# Patient Record
Sex: FEMALE | Race: WHITE | NOT HISPANIC OR LATINO | Employment: OTHER | URBAN - METROPOLITAN AREA
[De-identification: names, ages, dates, MRNs, and addresses within clinical notes are randomized per-mention and may not be internally consistent; named-entity substitution may affect disease eponyms.]

---

## 2020-12-26 ENCOUNTER — OFFICE VISIT (OUTPATIENT)
Dept: URGENT CARE | Facility: CLINIC | Age: 75
End: 2020-12-26
Payer: COMMERCIAL

## 2020-12-26 ENCOUNTER — HOSPITAL ENCOUNTER (OUTPATIENT)
Dept: RADIOLOGY | Facility: CLINIC | Age: 75
Discharge: HOME | End: 2020-12-26
Attending: PHYSICIAN ASSISTANT
Payer: COMMERCIAL

## 2020-12-26 VITALS
TEMPERATURE: 98 F | BODY MASS INDEX: 34.96 KG/M2 | SYSTOLIC BLOOD PRESSURE: 195 MMHG | HEIGHT: 62 IN | DIASTOLIC BLOOD PRESSURE: 75 MMHG | OXYGEN SATURATION: 95 % | HEART RATE: 63 BPM | WEIGHT: 190 LBS
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
## 2020-12-26 DIAGNOSIS — J90 PLEURAL EFFUSION, NOT ELSEWHERE CLASSIFIED: ICD-10-CM

## 2020-12-26 DIAGNOSIS — I10 ELEVATED BLOOD PRESSURE READING IN OFFICE WITH DIAGNOSIS OF HYPERTENSION: ICD-10-CM

## 2020-12-26 DIAGNOSIS — S22.42XA CLOSED FRACTURE OF MULTIPLE RIBS OF LEFT SIDE, INITIAL ENCOUNTER: ICD-10-CM

## 2020-12-26 PROCEDURE — 99214 OFFICE O/P EST MOD 30 MIN: CPT | Mod: S$GLB,,, | Performed by: PHYSICIAN ASSISTANT

## 2020-12-26 PROCEDURE — 71101 X-RAY EXAM UNILAT RIBS/CHEST: CPT | Mod: LT,S$GLB,, | Performed by: RADIOLOGY

## 2020-12-26 PROCEDURE — 99214 PR OFFICE/OUTPT VISIT, EST, LEVL IV, 30-39 MIN: ICD-10-PCS | Mod: S$GLB,,, | Performed by: PHYSICIAN ASSISTANT

## 2020-12-26 PROCEDURE — 1125F AMNT PAIN NOTED PAIN PRSNT: CPT | Mod: S$GLB,,, | Performed by: PHYSICIAN ASSISTANT

## 2020-12-26 PROCEDURE — 1125F PR PAIN SEVERITY QUANTIFIED, PAIN PRESENT: ICD-10-PCS | Mod: S$GLB,,, | Performed by: PHYSICIAN ASSISTANT

## 2020-12-26 PROCEDURE — 71101 XR RIBS MIN 3 VIEWS W/ PA CHEST LEFT: ICD-10-PCS | Mod: LT,S$GLB,, | Performed by: RADIOLOGY

## 2020-12-26 RX ORDER — VITAMIN B COMPLEX
1 CAPSULE ORAL
COMMUNITY

## 2020-12-26 RX ORDER — IRBESARTAN 300 MG/1
300 TABLET ORAL
COMMUNITY
Start: 2020-10-06

## 2020-12-26 RX ORDER — TRAMADOL HYDROCHLORIDE 50 MG/1
50 TABLET ORAL EVERY 6 HOURS PRN
Qty: 20 TABLET | Refills: 0 | Status: SHIPPED | OUTPATIENT
Start: 2020-12-26 | End: 2020-12-31

## 2020-12-26 RX ORDER — TIMOLOL MALEATE 5 MG/ML
SOLUTION/ DROPS OPHTHALMIC
COMMUNITY
Start: 2020-10-13

## 2020-12-26 RX ORDER — AZITHROMYCIN 250 MG/1
TABLET, FILM COATED ORAL
Qty: 6 TABLET | Refills: 0 | Status: SHIPPED | OUTPATIENT
Start: 2020-12-26 | End: 2021-10-29 | Stop reason: ALTCHOICE

## 2020-12-26 RX ORDER — NYSTATIN 100000 [USP'U]/G
POWDER TOPICAL
COMMUNITY
Start: 2020-03-17 | End: 2021-03-17

## 2020-12-26 RX ORDER — SERTRALINE HYDROCHLORIDE 100 MG/1
100 TABLET, FILM COATED ORAL DAILY
COMMUNITY

## 2020-12-26 RX ORDER — CHOLECALCIFEROL (VITAMIN D3) 25 MCG
1000 TABLET ORAL
COMMUNITY

## 2020-12-26 RX ORDER — BIMATOPROST 0.1 MG/ML
SOLUTION/ DROPS OPHTHALMIC
COMMUNITY
Start: 2020-10-13

## 2020-12-26 RX ORDER — AMLODIPINE BESYLATE 2.5 MG/1
2.5 TABLET ORAL
COMMUNITY
Start: 2020-10-06

## 2020-12-26 RX ORDER — PRAVASTATIN SODIUM 40 MG/1
TABLET ORAL
COMMUNITY
Start: 2020-10-07

## 2020-12-26 RX ORDER — ALENDRONATE SODIUM 70 MG/1
70 TABLET ORAL
COMMUNITY
Start: 2020-10-06 | End: 2021-04-04

## 2020-12-26 NOTE — PROGRESS NOTES
"Subjective:       Patient ID: Lori Bunn is a 75 y.o. female.    Vitals:  height is 5' 2" (1.575 m) and weight is 86.2 kg (190 lb). Her tympanic temperature is 98.4 °F (36.9 °C). Her blood pressure is 195/75 (abnormal) and her pulse is 63. Her oxygen saturation is 95%.     Chief Complaint: Flank Pain and Fall (yesterday )    Patient presents on today for pain on her left side from a fall on yesterday.  Patient states that she was standing on a stool not her balance and her left side hit the floor on impact.  She has been having pain to her left side and left upper back since the incident.  Patient states that she has fractured her ribs on the other side in the pain feels very similar.  Patient states that she also had atelectasis with her last rib fracture.  Pain is worse with touch, lifting or deep breathing.  She has been taking Advil with only some relief.  Patient states that she has a few tramadol at home that she has been prescribed previously but has not tried to take it.  She has been applying heat to the area with little relief.  She denies any wheezing, shortness of breath, chest pain, fever, bruising.  She is accompanied today with her daughter.    Flank Pain  This is a new problem. The current episode started more than 1 month ago. The problem has been waxing and waning since onset. The quality of the pain is described as aching. The pain is at a severity of 10/10. The pain is severe. The symptoms are aggravated by standing, twisting, position, lying down and bending. Pertinent negatives include no abdominal pain, bladder incontinence, bowel incontinence, chest pain, headaches, leg pain, numbness, pelvic pain, perianal numbness, tingling or weakness. Risk factors include history of osteoporosis.   Fall  Pertinent negatives include no abdominal pain, bowel incontinence, headaches, numbness or tingling.       Cardiovascular: Negative for chest pain.   Gastrointestinal: Negative for abdominal pain and " bowel incontinence.   Genitourinary: Positive for flank pain. Negative for bladder incontinence and pelvic pain.   Skin: Negative for erythema.   Neurological: Negative for headaches and numbness.       Objective:      Physical Exam   Constitutional: She is oriented to person, place, and time. She appears well-developed.   HENT:   Head: Normocephalic and atraumatic. Head is without abrasion, without contusion and without laceration.   Ears:   Right Ear: External ear normal.   Left Ear: External ear normal.   Nose: Nose normal.   Mouth/Throat: Oropharynx is clear and moist and mucous membranes are normal.   Eyes: Pupils are equal, round, and reactive to light. Conjunctivae, EOM and lids are normal.   Neck: Trachea normal, full passive range of motion without pain and phonation normal. Neck supple.   Cardiovascular: Normal rate, regular rhythm and normal heart sounds.   Pulmonary/Chest: Effort normal and breath sounds normal. No stridor. No respiratory distress.   Musculoskeletal: Normal range of motion.        Arms:    Neurological: She is alert and oriented to person, place, and time.   Skin: Skin is warm, dry, intact and no rash. Capillary refill takes less than 2 seconds. abrasion, burn, bruising, erythema and ecchymosisPsychiatric: Her speech is normal and behavior is normal. Judgment and thought content normal.   Nursing note and vitals reviewed.  Xr Rib Left W/ Pa Chest    Result Date: 12/26/2020  EXAMINATION: XR RIBS MIN 3 VIEWS W/ PA CHEST LEFT CLINICAL HISTORY: Unspecified fall, initial encounter TECHNIQUE: Single frontal chest x-ray.  Lateral and oblique x-ray views of the left-sided ribs. COMPARISON: None FINDINGS: Chest x-ray: The trachea is unremarkable.  There are calcifications of the aortic knob.  The cardiomediastinal silhouette is within normal limits.  The right hemidiaphragm is unremarkable.  There is elevation of the left hemidiaphragm.  There is obscuration of the left lateral costophrenic  angle.  There is no evidence of a pneumothorax.  There is no evidence of pneumomediastinum.  There is an airspace opacity left lung base. Left-sided ribs: There are acute minimally displaced fractures involving the left 3rd through 6th ribs.  The remainder of the osseous structures are within normal limits.     Acute minimally displaced fractures of the left 3rd through 6th ribs. Airspace opacities along with obscuration of the left lateral costophrenic angle.  The findings represent pulmonary contusion with small amount of left-sided pleural effusion.  Follow-up dedicated CT scan of the chest, as clinically warranted. This report was flagged in Epic as abnormal. Electronically signed by: Pete Cardoso MD Date:    12/26/2020 Time:    15:58        Assessment:       1. Fall, initial encounter    2. Elevated blood pressure reading in office with diagnosis of hypertension    3. Pleural effusion, not elsewhere classified    4. Closed fracture of multiple ribs of left side, initial encounter        Plan:       Outpatient CT ordered to evaluate pleural effusion, however recommend following up with her PCP and having him order this and evaluate her on Monday.  If unable to get in Monday or Tuesday then schedule CT through [facility].  Discussed with patient and daughter the importance of taking deep breaths despite pain on inhalation.  Advise that they get an incentive spirometer at the pharmacy and do it multiple times a day to prevent atelectasis or further fusion.  Patient has taken tramadol in the past without any complications or severe side effects.  May continue to take Advil also as needed for pain.  ER precautions discussed with patient and daughter.  Fall, initial encounter  -     XR RIB LEFT W/ PA CHEST; Future; Expected date: 12/26/2020    Elevated blood pressure reading in office with diagnosis of hypertension    Pleural effusion, not elsewhere classified  -     CT Chest Without Contrast; Future; Expected date:  12/26/2020  -     azithromycin (ZITHROMAX Z-LINETTE) 250 MG tablet; Take 2 tablets (500 mg) on  Day 1,  followed by 1 tablet (250 mg) once daily on Days 2 through 5.  Dispense: 6 tablet; Refill: 0    Closed fracture of multiple ribs of left side, initial encounter  -     CT Chest Without Contrast; Future; Expected date: 12/26/2020  -     traMADoL (ULTRAM) 50 mg tablet; Take 1 tablet (50 mg total) by mouth every 6 (six) hours as needed for Pain.  Dispense: 20 tablet; Refill: 0      Patient Instructions       Pleural Effusion     Pleural effusion is fluid buildup between the layers of tissue that line the outside of the lungs.   Your healthcare provider has told you that you have pleural effusion. Pleural effusion means that you have extra fluid between the pleura. This area is called the pleural space. The pleura are 2 layers of thin, smooth tissue that surround the lungs and line the chest. The pleural space usually holds only a small amount of fluid. This fluid lubricates the pleura. But if too much fluid fills the space, it can make it hard or painful to breathe.  There are 2 types of pleural effusion:  · Inflammatory. This is caused by a lung disease like pneumonia or lung cancer, both of which irritates the pleura.  · Noninflammatory. This is caused by abnormal fluid pressures inside the lungs. The pressure can be caused by congestive heart failure (CHF). In CHF, extra fluid collects inside the lung tissues because of a weak heart muscle. This extra fluid then leaks into the pleural space. Other causes of noninflammatory pleural effusions include kidney disease, liver disease, and malnutrition.  What are the symptoms of pleural effusion?  The symptoms of pleural effusion include:  · Sharp pains in the chest, especially when taking a breath, coughing, or sneezing  · Trouble breathing  · Cough  · Fever  What are the causes of pleural effusion?  Common causes include:  · Congestive heart failure  · Cirrhosis of the  liver  · Pneumonia  · Viral lung infection  · Cancer  · Blood clot in the lung (pulmonary embolism)  · Heart surgery  Chest infections like pneumonia and heart disease are the most common causes. Less common causes include lung cancer.  How is pleural effusion diagnosed?  Your healthcare provider will examine you and ask about your health history. Tests include:  · Blood tests  · Analysis of fluid in pleural space, chest X-ray, CT scan, or ultrasound  How is pleural effusion treated?  The extra fluid may be drained from the pleural space. This is done with a procedure called thoracentesis. This procedure uses a thin needle to draw out the fluid from the pleural space. In some cases, a tube is placed in the chest to drain the extra fluid. The tube will likely stay in place for several days.  You may have other treatments, depending on the cause of your pleural effusion. If its because of a bacterial infection, you will be given antibiotics to fight the infection. If its because of a heart condition, you will be given medicines and other treatment for your heart. Your healthcare provider can tell you more about the cause of your pleural effusion and your treatment choices.  What are the long-term concerns?  If untreated, pleural effusion can lead to serious health problems, such as collapsed lung from fluid filling the pleural space.     Call 911  Call 911 if you have:  · Trouble breathing  · Worsening chest pain   When to call your healthcare provider   Call your healthcare provider right away if you have:  · Continued coughing  · Fever  Date Last Reviewed: 12/1/2016  © 0060-7969 [employer]. [address] [place] [address]. All rights reserved. This information is not intended as a substitute for professional medical care. Always follow your healthcare professional's instructions.        Rib Fracture    You broke one or more ribs. This is called a rib fracture. Rib fractures do not require  a cast like other bones. They will heal by themselves in about 4-6 weeks. The first 3-4 weeks will be the most painful because deep breathing, coughing, or changing position from sitting to lying down, may cause the broken ends to move slightly.  Home care  · Rest. You should not be doing any heavy lifting or strenuous exertion until the pain goes away.  · It hurts to breathe when you have a broken rib. This puts you at risk of getting pneumonia from poor airflow through your lungs. To prevent this:  ¨ Take several very deep breaths once an hour while you're awake. Exhale through pursed lips as if you are blowing up a balloon. If possible, actually blow up a balloon or a rubber glove. This exercise builds up pressure inside the lung and prevents collapse of the small air sacs of the lung. This exercise may cause some pain at the site of injury, which is normal.  ¨ You may have gotten a breathing exercise device called an incentive spirometer. Use it at least 4 times a day, or as directed.  · Apply an ice pack over the injured area for 15 to 20 minutes every 1 to 2 hours. You should do this for the first 24 to 48 hours. You can make an ice pack by filling a plastic bag that seals at the top with ice cubes and then wrapping it with a thin towel. Continue with ice packs as needed for the relief of pain and swelling.  · You may use over-the-counter pain medicine to control pain, unless another pain medicine was prescribed. If you have chronic liver or kidney disease or ever had a stomach ulcer or GI bleeding, talk with your healthcare provider before using these medicines.  · If your pain is not controlled, contact your healthcare provider. Sometimes a stronger pain medicine may be needed. A nerve block can be done in case of severe pain. It will numb the nerve between the ribs.  Follow-up care  Follow up with your healthcare provider, or as advised. Rarely, a broken rib will cause complications within the first few  days that may not be evident during your initial exam. This can include collapsed lung, bleeding around the lung or into the abdomen, or pneumonia. Therefore, watch for the signs below.  If X-rays were taken, you will be told of any new findings that may affect your care.  Call 911  Call 911 if you have:  · Dizziness, weakness or fainting  · Shortness of breath with or without chest discomfort  · New or worsening abdominal pain  · Discomfort in other areas of your upper body such as your shoulders, jaw, neck, or arms  When to seek medical advice  Call your healthcare provider right away if any of these occur:  · Increasing chest pain with breathing  · Fever of 100.4°F (38°C) or above lasting for 24 to 48 hours  · Congested cough  Date Last Reviewed: 12/3/2015  © 1781-6100 [employer]. [address] [place] [address], [place] [address]. All rights reserved. This information is not intended as a substitute for professional medical care. Always follow your healthcare professional's instructions.

## 2020-12-26 NOTE — PATIENT INSTRUCTIONS
Pleural Effusion     Pleural effusion is fluid buildup between the layers of tissue that line the outside of the lungs.   Your healthcare provider has told you that you have pleural effusion. Pleural effusion means that you have extra fluid between the pleura. This area is called the pleural space. The pleura are 2 layers of thin, smooth tissue that surround the lungs and line the chest. The pleural space usually holds only a small amount of fluid. This fluid lubricates the pleura. But if too much fluid fills the space, it can make it hard or painful to breathe.  There are 2 types of pleural effusion:  · Inflammatory. This is caused by a lung disease like pneumonia or lung cancer, both of which irritates the pleura.  · Noninflammatory. This is caused by abnormal fluid pressures inside the lungs. The pressure can be caused by congestive heart failure (CHF). In CHF, extra fluid collects inside the lung tissues because of a weak heart muscle. This extra fluid then leaks into the pleural space. Other causes of noninflammatory pleural effusions include kidney disease, liver disease, and malnutrition.  What are the symptoms of pleural effusion?  The symptoms of pleural effusion include:  · Sharp pains in the chest, especially when taking a breath, coughing, or sneezing  · Trouble breathing  · Cough  · Fever  What are the causes of pleural effusion?  Common causes include:  · Congestive heart failure  · Cirrhosis of the liver  · Pneumonia  · Viral lung infection  · Cancer  · Blood clot in the lung (pulmonary embolism)  · Heart surgery  Chest infections like pneumonia and heart disease are the most common causes. Less common causes include lung cancer.  How is pleural effusion diagnosed?  Your healthcare provider will examine you and ask about your health history. Tests include:  · Blood tests  · Analysis of fluid in pleural space, chest X-ray, CT scan, or ultrasound  How is pleural effusion treated?  The extra fluid may  be drained from the pleural space. This is done with a procedure called thoracentesis. This procedure uses a thin needle to draw out the fluid from the pleural space. In some cases, a tube is placed in the chest to drain the extra fluid. The tube will likely stay in place for several days.  You may have other treatments, depending on the cause of your pleural effusion. If its because of a bacterial infection, you will be given antibiotics to fight the infection. If its because of a heart condition, you will be given medicines and other treatment for your heart. Your healthcare provider can tell you more about the cause of your pleural effusion and your treatment choices.  What are the long-term concerns?  If untreated, pleural effusion can lead to serious health problems, such as collapsed lung from fluid filling the pleural space.     Call 911  Call 911 if you have:  · Trouble breathing  · Worsening chest pain   When to call your healthcare provider   Call your healthcare provider right away if you have:  · Continued coughing  · Fever  Date Last Reviewed: 12/1/2016 © 2000-2017 [employer]. [address] [place] [address]. All rights reserved. This information is not intended as a substitute for professional medical care. Always follow your healthcare professional's instructions.        Rib Fracture    You broke one or more ribs. This is called a rib fracture. Rib fractures do not require a cast like other bones. They will heal by themselves in about 4-6 weeks. The first 3-4 weeks will be the most painful because deep breathing, coughing, or changing position from sitting to lying down, may cause the broken ends to move slightly.  Home care  · Rest. You should not be doing any heavy lifting or strenuous exertion until the pain goes away.  · It hurts to breathe when you have a broken rib. This puts you at risk of getting pneumonia from poor airflow through your lungs. To prevent  this:  ¨ Take several very deep breaths once an hour while you're awake. Exhale through pursed lips as if you are blowing up a balloon. If possible, actually blow up a balloon or a rubber glove. This exercise builds up pressure inside the lung and prevents collapse of the small air sacs of the lung. This exercise may cause some pain at the site of injury, which is normal.  ¨ You may have gotten a breathing exercise device called an incentive spirometer. Use it at least 4 times a day, or as directed.  · Apply an ice pack over the injured area for 15 to 20 minutes every 1 to 2 hours. You should do this for the first 24 to 48 hours. You can make an ice pack by filling a plastic bag that seals at the top with ice cubes and then wrapping it with a thin towel. Continue with ice packs as needed for the relief of pain and swelling.  · You may use over-the-counter pain medicine to control pain, unless another pain medicine was prescribed. If you have chronic liver or kidney disease or ever had a stomach ulcer or GI bleeding, talk with your healthcare provider before using these medicines.  · If your pain is not controlled, contact your healthcare provider. Sometimes a stronger pain medicine may be needed. A nerve block can be done in case of severe pain. It will numb the nerve between the ribs.  Follow-up care  Follow up with your healthcare provider, or as advised. Rarely, a broken rib will cause complications within the first few days that may not be evident during your initial exam. This can include collapsed lung, bleeding around the lung or into the abdomen, or pneumonia. Therefore, watch for the signs below.  If X-rays were taken, you will be told of any new findings that may affect your care.  Call 911  Call 911 if you have:  · Dizziness, weakness or fainting  · Shortness of breath with or without chest discomfort  · New or worsening abdominal pain  · Discomfort in other areas of your upper body such as your  shoulders, jaw, neck, or arms  When to seek medical advice  Call your healthcare provider right away if any of these occur:  · Increasing chest pain with breathing  · Fever of 100.4°F (38°C) or above lasting for 24 to 48 hours  · Congested cough  Date Last Reviewed: 12/3/2015  © 3900-5028 [employer]. [address] [place] [address], [place] [id number]. All rights reserved. This information is not intended as a substitute for professional medical care. Always follow your healthcare professional's instructions.

## 2021-01-10 ENCOUNTER — IMMUNIZATION (OUTPATIENT)
Dept: INTERNAL MEDICINE | Facility: CLINIC | Age: 76
End: 2021-01-10
Payer: COMMERCIAL

## 2021-01-10 DIAGNOSIS — Z23 NEED FOR VACCINATION: ICD-10-CM

## 2021-01-10 PROCEDURE — 91300 COVID-19, MRNA, LNP-S, PF, 30 MCG/0.3 ML DOSE VACCINE: CPT | Mod: PBBFAC | Performed by: FAMILY MEDICINE

## 2021-01-31 ENCOUNTER — IMMUNIZATION (OUTPATIENT)
Dept: INTERNAL MEDICINE | Facility: CLINIC | Age: 76
End: 2021-01-31
Payer: COMMERCIAL

## 2021-01-31 DIAGNOSIS — Z23 NEED FOR VACCINATION: Primary | ICD-10-CM

## 2021-01-31 PROCEDURE — 91300 COVID-19, MRNA, LNP-S, PF, 30 MCG/0.3 ML DOSE VACCINE: CPT | Mod: PBBFAC | Performed by: FAMILY MEDICINE

## 2021-01-31 PROCEDURE — 0002A COVID-19, MRNA, LNP-S, PF, 30 MCG/0.3 ML DOSE VACCINE: CPT | Mod: PBBFAC | Performed by: FAMILY MEDICINE

## 2021-10-29 ENCOUNTER — OFFICE VISIT (OUTPATIENT)
Dept: URGENT CARE | Facility: CLINIC | Age: 76
End: 2021-10-29
Payer: COMMERCIAL

## 2021-10-29 VITALS
BODY MASS INDEX: 34.96 KG/M2 | SYSTOLIC BLOOD PRESSURE: 144 MMHG | RESPIRATION RATE: 20 BRPM | TEMPERATURE: 99 F | HEART RATE: 69 BPM | HEIGHT: 62 IN | DIASTOLIC BLOOD PRESSURE: 70 MMHG | OXYGEN SATURATION: 98 % | WEIGHT: 190 LBS

## 2021-10-29 DIAGNOSIS — R05.8 PRODUCTIVE COUGH: ICD-10-CM

## 2021-10-29 DIAGNOSIS — R09.89 CHEST CONGESTION: ICD-10-CM

## 2021-10-29 DIAGNOSIS — J32.9 SINUSITIS, UNSPECIFIED CHRONICITY, UNSPECIFIED LOCATION: ICD-10-CM

## 2021-10-29 DIAGNOSIS — R06.02 SOB (SHORTNESS OF BREATH): ICD-10-CM

## 2021-10-29 DIAGNOSIS — R51.9 SINUS HEADACHE: ICD-10-CM

## 2021-10-29 DIAGNOSIS — J30.9 ALLERGIC SHINERS: ICD-10-CM

## 2021-10-29 DIAGNOSIS — J34.89 SINUS PRESSURE: ICD-10-CM

## 2021-10-29 DIAGNOSIS — R53.83 FATIGUE, UNSPECIFIED TYPE: Primary | ICD-10-CM

## 2021-10-29 LAB
CTP QC/QA: YES
CTP QC/QA: YES
POC MOLECULAR INFLUENZA A AGN: NEGATIVE
POC MOLECULAR INFLUENZA B AGN: NEGATIVE
SARS-COV-2 RDRP RESP QL NAA+PROBE: NEGATIVE

## 2021-10-29 PROCEDURE — 1160F RVW MEDS BY RX/DR IN RCRD: CPT | Mod: CPTII,S$GLB,, | Performed by: PHYSICIAN ASSISTANT

## 2021-10-29 PROCEDURE — 1159F MED LIST DOCD IN RCRD: CPT | Mod: CPTII,S$GLB,, | Performed by: PHYSICIAN ASSISTANT

## 2021-10-29 PROCEDURE — 1125F PR PAIN SEVERITY QUANTIFIED, PAIN PRESENT: ICD-10-PCS | Mod: CPTII,S$GLB,, | Performed by: PHYSICIAN ASSISTANT

## 2021-10-29 PROCEDURE — 3077F SYST BP >= 140 MM HG: CPT | Mod: CPTII,S$GLB,, | Performed by: PHYSICIAN ASSISTANT

## 2021-10-29 PROCEDURE — 71046 XR CHEST PA AND LATERAL: ICD-10-PCS | Mod: S$GLB,,, | Performed by: RADIOLOGY

## 2021-10-29 PROCEDURE — 71046 X-RAY EXAM CHEST 2 VIEWS: CPT | Mod: S$GLB,,, | Performed by: RADIOLOGY

## 2021-10-29 PROCEDURE — 3077F PR MOST RECENT SYSTOLIC BLOOD PRESSURE >= 140 MM HG: ICD-10-PCS | Mod: CPTII,S$GLB,, | Performed by: PHYSICIAN ASSISTANT

## 2021-10-29 PROCEDURE — 1125F AMNT PAIN NOTED PAIN PRSNT: CPT | Mod: CPTII,S$GLB,, | Performed by: PHYSICIAN ASSISTANT

## 2021-10-29 PROCEDURE — 87502 POCT INFLUENZA A/B MOLECULAR: ICD-10-PCS | Mod: QW,S$GLB,, | Performed by: PHYSICIAN ASSISTANT

## 2021-10-29 PROCEDURE — 3078F DIAST BP <80 MM HG: CPT | Mod: CPTII,S$GLB,, | Performed by: PHYSICIAN ASSISTANT

## 2021-10-29 PROCEDURE — 1159F PR MEDICATION LIST DOCUMENTED IN MEDICAL RECORD: ICD-10-PCS | Mod: CPTII,S$GLB,, | Performed by: PHYSICIAN ASSISTANT

## 2021-10-29 PROCEDURE — 87502 INFLUENZA DNA AMP PROBE: CPT | Mod: QW,S$GLB,, | Performed by: PHYSICIAN ASSISTANT

## 2021-10-29 PROCEDURE — 3078F PR MOST RECENT DIASTOLIC BLOOD PRESSURE < 80 MM HG: ICD-10-PCS | Mod: CPTII,S$GLB,, | Performed by: PHYSICIAN ASSISTANT

## 2021-10-29 PROCEDURE — 1160F PR REVIEW ALL MEDS BY PRESCRIBER/CLIN PHARMACIST DOCUMENTED: ICD-10-PCS | Mod: CPTII,S$GLB,, | Performed by: PHYSICIAN ASSISTANT

## 2021-10-29 PROCEDURE — U0002 COVID-19 LAB TEST NON-CDC: HCPCS | Mod: QW,S$GLB,, | Performed by: PHYSICIAN ASSISTANT

## 2021-10-29 PROCEDURE — 99214 OFFICE O/P EST MOD 30 MIN: CPT | Mod: S$GLB,,, | Performed by: PHYSICIAN ASSISTANT

## 2021-10-29 PROCEDURE — 99214 PR OFFICE/OUTPT VISIT, EST, LEVL IV, 30-39 MIN: ICD-10-PCS | Mod: S$GLB,,, | Performed by: PHYSICIAN ASSISTANT

## 2021-10-29 PROCEDURE — U0002: ICD-10-PCS | Mod: QW,S$GLB,, | Performed by: PHYSICIAN ASSISTANT

## 2021-10-29 RX ORDER — BENZONATATE 100 MG/1
100 CAPSULE ORAL 3 TIMES DAILY PRN
Qty: 21 CAPSULE | Refills: 0 | Status: SHIPPED | OUTPATIENT
Start: 2021-10-29 | End: 2021-11-05

## 2021-10-29 RX ORDER — FLUTICASONE PROPIONATE 50 MCG
1 SPRAY, SUSPENSION (ML) NASAL DAILY
Qty: 9.9 ML | Refills: 0 | Status: SHIPPED | OUTPATIENT
Start: 2021-10-29

## 2021-10-29 RX ORDER — MINERAL OIL
180 ENEMA (ML) RECTAL DAILY
Qty: 30 TABLET | Refills: 0 | Status: SHIPPED | OUTPATIENT
Start: 2021-10-29 | End: 2021-11-28

## 2021-10-29 RX ORDER — AZITHROMYCIN 250 MG/1
TABLET, FILM COATED ORAL
Qty: 6 TABLET | Refills: 0 | Status: SHIPPED | OUTPATIENT
Start: 2021-10-29

## 2021-11-01 ENCOUNTER — TELEPHONE (OUTPATIENT)
Dept: URGENT CARE | Facility: CLINIC | Age: 76
End: 2021-11-01
Payer: COMMERCIAL

## 2023-11-27 ENCOUNTER — OFFICE VISIT (OUTPATIENT)
Dept: URGENT CARE | Facility: CLINIC | Age: 78
End: 2023-11-27
Payer: COMMERCIAL

## 2023-11-27 ENCOUNTER — HOSPITAL ENCOUNTER (OUTPATIENT)
Dept: RADIOLOGY | Facility: CLINIC | Age: 78
Discharge: HOME | End: 2023-11-27
Attending: NURSE PRACTITIONER
Payer: COMMERCIAL

## 2023-11-27 VITALS
BODY MASS INDEX: 35.3 KG/M2 | OXYGEN SATURATION: 95 % | RESPIRATION RATE: 12 BRPM | HEART RATE: 61 BPM | WEIGHT: 191.81 LBS | TEMPERATURE: 98 F | SYSTOLIC BLOOD PRESSURE: 143 MMHG | DIASTOLIC BLOOD PRESSURE: 64 MMHG | HEIGHT: 62 IN
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
## 2023-11-27 DIAGNOSIS — R07.81 RIB PAIN ON LEFT SIDE: ICD-10-CM

## 2023-11-27 DIAGNOSIS — S22.32XA CLOSED FRACTURE OF ONE RIB OF LEFT SIDE, INITIAL ENCOUNTER: ICD-10-CM

## 2023-11-27 PROCEDURE — 99214 PR OFFICE/OUTPT VISIT, EST, LEVL IV, 30-39 MIN: ICD-10-PCS | Mod: S$GLB,,, | Performed by: NURSE PRACTITIONER

## 2023-11-27 PROCEDURE — 99214 OFFICE O/P EST MOD 30 MIN: CPT | Mod: S$GLB,,, | Performed by: NURSE PRACTITIONER

## 2023-11-27 PROCEDURE — 71100 X-RAY EXAM RIBS UNI 2 VIEWS: CPT | Mod: LT,S$GLB,, | Performed by: RADIOLOGY

## 2023-11-27 PROCEDURE — 71100 XR RIBS 2 VIEW LEFT: ICD-10-PCS | Mod: LT,S$GLB,, | Performed by: RADIOLOGY

## 2023-11-27 RX ORDER — TRAMADOL HYDROCHLORIDE 50 MG/1
50 TABLET ORAL EVERY 6 HOURS PRN
Qty: 20 TABLET | Refills: 0 | Status: SHIPPED | OUTPATIENT
Start: 2023-11-27

## 2023-11-27 NOTE — PROGRESS NOTES
"Subjective:      Patient ID: Lori Bunn is a 78 y.o. female.    Vitals:  height is 5' 2" (1.575 m) and weight is 87 kg (191 lb 12.8 oz). Her tympanic temperature is 98.1 °F (36.7 °C). Her blood pressure is 143/64 (abnormal) and her pulse is 61. Her respiration is 12 and oxygen saturation is 95%.     Chief Complaint: Rib Injury    Lori Bunn is a 78 year old female whom presents to urgent care for evaluation of left sided rib pain.Patient reports the pain is secondary to a fall that occurred while visiting a friend at the hospital on Thanksgiving. Patient reports tripping over the evelin. She reports it was not a direct impact to the ribs. Patient denies any shortness of breathe.      Other  This is a new problem. The current episode started in the past 7 days (11/23). The problem occurs constantly. The problem has been gradually worsening. Treatments tried: tramadol. The treatment provided no relief.       Respiratory:  Negative for chest tightness, shortness of breath, stridor and wheezing.    Musculoskeletal:  Positive for pain.      Objective:     Physical Exam   Constitutional: She is oriented to person, place, and time. She appears well-developed. She is cooperative.   HENT:   Head: Normocephalic and atraumatic.   Ears:   Right Ear: Hearing, tympanic membrane, external ear and ear canal normal.   Left Ear: Hearing, tympanic membrane, external ear and ear canal normal.   Nose: Nose normal. No mucosal edema or nasal deformity. No epistaxis. Right sinus exhibits no maxillary sinus tenderness and no frontal sinus tenderness. Left sinus exhibits no maxillary sinus tenderness and no frontal sinus tenderness.   Mouth/Throat: Uvula is midline, oropharynx is clear and moist and mucous membranes are normal. No trismus in the jaw. Normal dentition. No uvula swelling.   Eyes: Conjunctivae and lids are normal.   Neck: Trachea normal and phonation normal. Neck supple.   Cardiovascular: Normal rate, regular rhythm, " normal heart sounds and normal pulses.   Pulmonary/Chest: Effort normal and breath sounds normal.       Abdominal: Normal appearance and bowel sounds are normal. Soft.   Musculoskeletal: Normal range of motion.         General: Normal range of motion.   Neurological: She is alert and oriented to person, place, and time. She exhibits normal muscle tone.   Skin: Skin is warm, dry and intact.   Psychiatric: Her speech is normal and behavior is normal. Judgment and thought content normal.   Nursing note and vitals reviewed.  X-Ray Ribs 2 View Left    Result Date: 11/27/2023  EXAM: XR RIBS 2 VIEW LEFT CLINICAL INDICATION: Injury to the chest from an unspecified fall.  Chest pain FINDINGS: No comparison studies are available. Left lower lobe scarring or atelectasis with small left effusion. The lungs are otherwise clear. The cardiac silhouette size is normal. The trachea is midline and the mediastinal width is normal. Negative for right effusion or pneumothorax. Pulmonary vasculature is normal. Convex right curvature of the thoracic spine with marginal spondylosis.  Tortuous aorta with aortic arch calcifications. Multiple old left-sided rib fractures noted.  There is a possible nondisplaced acute fracture involving the anterior left fifth rib.     1.  Possible nondisplaced acute fracture of the anterior left fifth rib. 2.  Left basilar scarring or atelectasis and tiny left effusion. 3.  Negative for acute process otherwise.  Incidental findings as noted above. Finalized on: 11/27/2023 5:44 PM By:  Jimmie Forte MD [facility]# [id number]      2023-11-27 17:[id number]    [facility]        Assessment:     1. Fall, initial encounter    2. Rib pain on left side    3. Closed fracture of one rib of left side, initial encounter        Plan:       Fall, initial encounter  -     X-Ray Ribs 2 View Left; Future; Expected date: 11/27/2023  -     traMADoL (ULTRAM) 50 mg tablet; Take 1 tablet (50 mg total) by mouth every 6 (six) hours as needed for  Pain.  Dispense: 20 tablet; Refill: 0    Rib pain on left side  -     X-Ray Ribs 2 View Left; Future; Expected date: 11/27/2023  -     traMADoL (ULTRAM) 50 mg tablet; Take 1 tablet (50 mg total) by mouth every 6 (six) hours as needed for Pain.  Dispense: 20 tablet; Refill: 0    Closed fracture of one rib of left side, initial encounter  -     traMADoL (ULTRAM) 50 mg tablet; Take 1 tablet (50 mg total) by mouth every 6 (six) hours as needed for Pain.  Dispense: 20 tablet; Refill: 0          Medical Decision Making:   Differential Diagnosis:   Fracture vs contusion  Clinical Tests:   Radiological Study: Ordered and Reviewed  Urgent Care Management:  Previous encounters and x-rays were independently reviewed. Symptomatic treatment was discussed such as ice therapy, tylenol/ ibuprofen and splinting (for pain) when coughing or deep breathing. Patient reports she has tolerated tramadol in the past so tramadol was prescribed for break through pain. We discussed the importance of deep breathing exercises to help prevent pneumonia. Expectations and healing time was discussed in great details. Instructed patient to contact primary care provider if they: develop a fever; notice bruising on the chest; begin coughing a lot; or have any questions or concerns about the treatment plan or condition. Advised patient to  seek care immediately or call 911 if they: suddenly have more severe chest pain; begin coughing up blood; experience a fever or chills and increased shortness of breath; suddenly feel lightheaded and short of breath; or notice any warmth, tenderness, and painfulness to the extremities.Treatment plan as well as options and alternatives reviewed and discussed with patient. All of the patients questions and concerns were addressed.The patient verbalized understanding and agrees with the discussed plan of care. Patient remained stable and was discharged in no acute distress.                 Patient Instructions   Please  follow up with your Primary care provider within 2-5 days if your signs and symptoms have not resolved or worsen.     If your condition worsens or fails to improve we recommend that you receive another evaluation at the emergency room immediately or contact your primary medical clinic to discuss your concerns.   You must understand that you have received an Urgent Care treatment only and that you may be released before all of your medical problems are known or treated. You, the patient, will arrange for follow up care as instructed.     RED FLAGS/WARNING SYMPTOMS DISCUSSED WITH PATIENT THAT WOULD WARRANT EMERGENT MEDICAL ATTENTION. PATIENT VERBALIZED UNDERSTANDING.

## 2023-11-29 ENCOUNTER — TELEPHONE (OUTPATIENT)
Dept: URGENT CARE | Facility: CLINIC | Age: 78
End: 2023-11-29
Payer: COMMERCIAL

## 2024-08-04 ENCOUNTER — OFFICE VISIT (OUTPATIENT)
Dept: URGENT CARE | Facility: CLINIC | Age: 79
End: 2024-08-04
Payer: COMMERCIAL

## 2024-08-04 ENCOUNTER — HOSPITAL ENCOUNTER (OUTPATIENT)
Dept: RADIOLOGY | Facility: CLINIC | Age: 79
Discharge: HOME | End: 2024-08-04
Attending: NURSE PRACTITIONER
Payer: COMMERCIAL

## 2024-08-04 VITALS
TEMPERATURE: 98 F | WEIGHT: 191.81 LBS | HEART RATE: 103 BPM | OXYGEN SATURATION: 92 % | RESPIRATION RATE: 15 BRPM | SYSTOLIC BLOOD PRESSURE: 105 MMHG | BODY MASS INDEX: 35.3 KG/M2 | DIASTOLIC BLOOD PRESSURE: 60 MMHG | HEIGHT: 62 IN

## 2024-08-04 DIAGNOSIS — R06.02 SHORTNESS OF BREATH: ICD-10-CM

## 2024-08-04 DIAGNOSIS — R05.9 COUGH, UNSPECIFIED TYPE: ICD-10-CM

## 2024-08-04 DIAGNOSIS — J18.9 PARAPNEUMONIC EFFUSION: ICD-10-CM

## 2024-08-04 DIAGNOSIS — R50.9 SUBJECTIVE FEVER: ICD-10-CM

## 2024-08-04 DIAGNOSIS — J18.9 PNEUMONIA OF RIGHT LOWER LOBE DUE TO INFECTIOUS ORGANISM: Primary | ICD-10-CM

## 2024-08-04 DIAGNOSIS — J91.8 PARAPNEUMONIC EFFUSION: ICD-10-CM

## 2024-08-04 LAB
CTP QC/QA: YES
POC MOLECULAR INFLUENZA A AGN: NEGATIVE
POC MOLECULAR INFLUENZA B AGN: NEGATIVE
RSV RAPID ANTIGEN: NEGATIVE
SARS-COV-2 AG RESP QL IA.RAPID: NEGATIVE

## 2024-08-04 PROCEDURE — 87502 INFLUENZA DNA AMP PROBE: CPT | Mod: QW,,, | Performed by: NURSE PRACTITIONER

## 2024-08-04 PROCEDURE — 94640 AIRWAY INHALATION TREATMENT: CPT | Mod: ,,, | Performed by: EMERGENCY MEDICINE

## 2024-08-04 PROCEDURE — 71046 X-RAY EXAM CHEST 2 VIEWS: CPT | Mod: S$GLB,,, | Performed by: RADIOLOGY

## 2024-08-04 PROCEDURE — 87807 RSV ASSAY W/OPTIC: CPT | Mod: QW,,, | Performed by: NURSE PRACTITIONER

## 2024-08-04 PROCEDURE — 99214 OFFICE O/P EST MOD 30 MIN: CPT | Mod: 25,,, | Performed by: NURSE PRACTITIONER

## 2024-08-04 PROCEDURE — 87811 SARS-COV-2 COVID19 W/OPTIC: CPT | Mod: QW,,, | Performed by: NURSE PRACTITIONER

## 2024-08-04 RX ORDER — AZITHROMYCIN 250 MG/1
TABLET, FILM COATED ORAL
Qty: 6 TABLET | Refills: 0 | Status: SHIPPED | OUTPATIENT
Start: 2024-08-04 | End: 2024-08-09

## 2024-08-04 RX ORDER — AMOXICILLIN AND CLAVULANATE POTASSIUM 875; 125 MG/1; MG/1
1 TABLET, FILM COATED ORAL 2 TIMES DAILY
Qty: 14 TABLET | Refills: 0 | Status: SHIPPED | OUTPATIENT
Start: 2024-08-04 | End: 2024-08-11

## 2024-08-04 RX ORDER — BENZONATATE 100 MG/1
100 CAPSULE ORAL 3 TIMES DAILY PRN
Qty: 30 CAPSULE | Refills: 0 | Status: SHIPPED | OUTPATIENT
Start: 2024-08-04 | End: 2024-08-14

## 2024-08-04 RX ORDER — LEVALBUTEROL INHALATION SOLUTION 1.25 MG/3ML
0.62 SOLUTION RESPIRATORY (INHALATION)
Status: COMPLETED | OUTPATIENT
Start: 2024-08-04 | End: 2024-08-04

## 2024-08-04 RX ORDER — ALBUTEROL SULFATE 0.83 MG/ML
2.5 SOLUTION RESPIRATORY (INHALATION) EVERY 6 HOURS PRN
Qty: 1 EACH | Refills: 0 | Status: SHIPPED | OUTPATIENT
Start: 2024-08-04 | End: 2025-08-04

## 2024-08-04 RX ORDER — IPRATROPIUM BROMIDE 0.5 MG/2.5ML
0.25 SOLUTION RESPIRATORY (INHALATION)
Status: DISCONTINUED | OUTPATIENT
Start: 2024-08-04 | End: 2024-08-04

## 2024-08-04 RX ORDER — LEVALBUTEROL INHALATION SOLUTION 1.25 MG/3ML
1.25 SOLUTION RESPIRATORY (INHALATION)
Status: DISCONTINUED | OUTPATIENT
Start: 2024-08-04 | End: 2024-08-04

## 2024-08-04 RX ORDER — IPRATROPIUM BROMIDE 0.5 MG/2.5ML
0.5 SOLUTION RESPIRATORY (INHALATION)
Status: COMPLETED | OUTPATIENT
Start: 2024-08-04 | End: 2024-08-04

## 2024-08-04 RX ORDER — IPRATROPIUM BROMIDE 0.5 MG/2.5ML
0.5 SOLUTION RESPIRATORY (INHALATION)
Status: DISCONTINUED | OUTPATIENT
Start: 2024-08-04 | End: 2024-08-04

## 2024-08-04 RX ORDER — ALBUTEROL SULFATE 90 UG/1
1-2 INHALANT RESPIRATORY (INHALATION) EVERY 4 HOURS PRN
Qty: 6.7 G | Refills: 0 | Status: SHIPPED | OUTPATIENT
Start: 2024-08-04 | End: 2024-09-03

## 2024-08-04 RX ADMIN — LEVALBUTEROL INHALATION SOLUTION 0.62 MG: 1.25 SOLUTION RESPIRATORY (INHALATION) at 10:08

## 2024-08-04 RX ADMIN — IPRATROPIUM BROMIDE 0.5 MG: 0.5 SOLUTION RESPIRATORY (INHALATION) at 10:08

## 2024-08-05 ENCOUNTER — TELEPHONE (OUTPATIENT)
Dept: URGENT CARE | Facility: CLINIC | Age: 79
End: 2024-08-05
Payer: COMMERCIAL

## 2025-04-29 ENCOUNTER — OFFICE VISIT (OUTPATIENT)
Dept: URGENT CARE | Facility: CLINIC | Age: 80
End: 2025-04-29
Payer: MEDICARE

## 2025-04-29 VITALS
OXYGEN SATURATION: 93 % | HEIGHT: 62 IN | TEMPERATURE: 99 F | WEIGHT: 200.63 LBS | HEART RATE: 75 BPM | SYSTOLIC BLOOD PRESSURE: 157 MMHG | RESPIRATION RATE: 20 BRPM | DIASTOLIC BLOOD PRESSURE: 94 MMHG | BODY MASS INDEX: 36.92 KG/M2
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
## 2025-04-29 PROCEDURE — 99214 OFFICE O/P EST MOD 30 MIN: CPT | Mod: 25,S$GLB,, | Performed by: NURSE PRACTITIONER

## 2025-04-29 PROCEDURE — 90471 IMMUNIZATION ADMIN: CPT | Mod: S$GLB,,, | Performed by: NURSE PRACTITIONER

## 2025-04-29 PROCEDURE — 90715 TDAP VACCINE 7 YRS/> IM: CPT | Mod: S$GLB,,, | Performed by: NURSE PRACTITIONER

## 2025-04-29 RX ORDER — AMOXICILLIN AND CLAVULANATE POTASSIUM 875; 125 MG/1; MG/1
1 TABLET, FILM COATED ORAL 2 TIMES DAILY
Qty: 14 TABLET | Refills: 0 | Status: SHIPPED | OUTPATIENT
Start: 2025-04-29 | End: 2025-05-06

## 2025-04-29 NOTE — PROGRESS NOTES
"Subjective:      Patient ID: Lori Bunn is a 79 y.o. female.    Vitals:  height is 5' 2" (1.575 m) and weight is 91 kg (200 lb 9.9 oz). Her tympanic temperature is 99.4 °F (37.4 °C). Her blood pressure is 157/94 (abnormal) and her pulse is 75. Her respiration is 20 and oxygen saturation is 93% (abnormal).     Chief Complaint: Animal Bite    Pt presents with dog bite to right hand with redness and a small skin tare. Pt states its her personal dog and all of the dogs vaccines are current    Animal Bite   The incident occurred just prior to arrival. The incident occurred at home. She came to the ER via personal transport. There is an injury to the Right hand. The patient is experiencing no pain (slight soreness). It is unlikely that a foreign body is present. Pertinent negatives include no chest pain, no fussiness, no numbness, no visual disturbance, no bladder incontinence, no headaches, no hearing loss, no cough and no difficulty breathing.       Constitution: Negative for chills and fever.   HENT:  Negative for hearing loss.    Cardiovascular:  Negative for chest pain.   Respiratory:  Negative for cough.    Genitourinary:  Negative for bladder incontinence.   Skin:  Positive for wound.   Neurological:  Negative for headaches and numbness.      Objective:     Physical Exam   Constitutional: She is oriented to person, place, and time.   HENT:   Head: Normocephalic and atraumatic.   Nose: Nose normal.   Cardiovascular: Normal rate.   Pulmonary/Chest: She is in respiratory distress.   Abdominal: Normal appearance.   Musculoskeletal: Normal range of motion.         General: Normal range of motion.   Neurological: She is alert and oriented to person, place, and time.   Skin: Skin is warm and dry.         Comments: Right hand laceration dorsally located. 2.0 cm. Superficial. Puncture wound between index finger and thumb and below the index finger palmar side.    Nursing note reviewed.      Assessment:     1. Dog bite, " initial encounter        Plan:       Dog bite, initial encounter  -     amoxicillin-clavulanate 875-125mg (AUGMENTIN) 875-125 mg per tablet; Take 1 tablet by mouth 2 (two) times daily. for 7 days  Dispense: 14 tablet; Refill: 0  -     Tdap (BOOSTRIX) vaccine injection 0.5 mL    Soaked in Hibiclens and water. No foreign bodies appreciated. Steri strips applied and nonstick dressing applied.     If symptoms worsen or fail to improve, follow-up with primary care doctor or nearest ER. After visit summary given and discussed. Patient verbalized understanding and agrees with treatment plan. Patient remained stable and was discharged in no acute distress.